# Patient Record
Sex: MALE | Race: WHITE | NOT HISPANIC OR LATINO | Employment: OTHER | URBAN - NONMETROPOLITAN AREA
[De-identification: names, ages, dates, MRNs, and addresses within clinical notes are randomized per-mention and may not be internally consistent; named-entity substitution may affect disease eponyms.]

---

## 2021-06-11 NOTE — PROGRESS NOTES
"Patient: Sabina Wilkinson    YOB: 1955    Date: 06/18/2021    Primary Care Provider: Arianna Donnelly MD    Chief Complaint   Patient presents with   • Hernia     eval inguinal       SUBJECTIVE:    History of present illness:  Patient is s/p left inguinal hernia repair which was done in the remote past.  I saw the patient in the office today as a consultation for evaluation and treatment of a painful bulge in his right inguinal area which has been present for \"several months.\"  Patient has pain with prolonged standing and lifting.  No change in bowel habits no nausea or vomiting.    The following portions of the patient's history were reviewed and updated as appropriate: allergies, current medications, past family history, past medical history, past social history, past surgical history and problem list.    Review of Systems   Constitutional: Negative for chills, fever and unexpected weight change.   HENT: Negative for trouble swallowing and voice change.    Eyes: Negative for visual disturbance.   Respiratory: Negative for apnea, cough, chest tightness, shortness of breath and wheezing.    Cardiovascular: Negative for chest pain, palpitations and leg swelling.   Gastrointestinal: Negative for abdominal distention, abdominal pain, anal bleeding, blood in stool, constipation, diarrhea, nausea, rectal pain and vomiting.   Endocrine: Negative for cold intolerance and heat intolerance.   Genitourinary: Negative for difficulty urinating, dysuria, flank pain, scrotal swelling and testicular pain.   Musculoskeletal: Negative for back pain, gait problem and joint swelling.   Skin: Negative for color change, rash and wound.   Neurological: Negative for dizziness, syncope, speech difficulty, weakness, numbness and headaches.   Hematological: Negative for adenopathy. Does not bruise/bleed easily.   Psychiatric/Behavioral: Negative for confusion. The patient is not nervous/anxious.        History:  Past Medical " "History:   Diagnosis Date   • High cholesterol    • Hypertension        Past Surgical History:   Procedure Laterality Date   • APPENDECTOMY     • HERNIA REPAIR     • NOSE SURGERY         Family History   Problem Relation Age of Onset   • Hypertension Mother    • Stroke Mother        Social History     Tobacco Use   • Smoking status: Never Smoker   • Smokeless tobacco: Never Used   Substance Use Topics   • Alcohol use: Defer   • Drug use: Defer       Allergies:  Allergies   Allergen Reactions   • Penicillins Other (See Comments) and Rash       Medications:    Current Outpatient Medications:   •  lisinopril (PRINIVIL,ZESTRIL) 20 MG tablet, , Disp: , Rfl:   •  lovastatin (MEVACOR) 40 MG tablet, , Disp: , Rfl:   •  omeprazole OTC (PriLOSEC OTC) 20 MG EC tablet, omeprazole 20 mg tablet,delayed release, Disp: , Rfl:     OBJECTIVE:    Vital Signs:   Vitals:    06/18/21 1410   BP: 140/82   Pulse: 67   Temp: 98.4 °F (36.9 °C)   SpO2: 99%   Weight: 92.9 kg (204 lb 12.8 oz)   Height: 182.9 cm (72\")       Physical Exam:   General Appearance:    Alert, cooperative, in no acute distress   Head:    Normocephalic, without obvious abnormality, atraumatic   Eyes:            Lids and lashes normal, conjunctivae and sclerae normal, no   icterus, no pallor, corneas clear, PERRLA   Ears:    Ears appear intact with no abnormalities noted   Throat:   No oral lesions, no thrush, oral mucosa moist   Neck:   No adenopathy, supple, trachea midline, no thyromegaly, no   carotid bruit, no JVD   Lungs:     Clear to auscultation,respirations regular, even and                  unlabored    Heart:    Regular rhythm and normal rate, normal S1 and S2, no            murmur   Abdomen:     no masses, no organomegaly, soft non-tender, non-distended, no guarding, there is evidence of a large right inguinal hernia, reducible and nontender to palpation.   Extremities:   Moves all extremities well, no edema, no cyanosis, no             redness   Pulses:   " Pulses palpable and equal bilaterally   Skin:   No bleeding, bruising or rash   Lymph nodes:   No palpable adenopathy   Neurologic:   Cranial nerves 2 - 12 grossly intact, sensation intact        Results Review:   I reviewed the patient's new clinical results.    Review of Systems was reviewed and confirmed as accurate as documented by the MA.    ASSESSMENT/PLAN:    1. Non-recurrent unilateral inguinal hernia without obstruction or gangrene        I had a detailed and extensive discussion with the patient in the office and they understand that they need to undergo right inguinal hernia repair with mesh.  Full risks and benefits of operative versus nonoperative intervention were discussed with the patient and these included things such as nonresolution of symptoms and possible worsening of symptoms without surgical intervention versus infection, bleeding, possible recurrent hernia, possible postoperative neuralgia from nerve damage or involvement with scar tissue, etc.  The patient understands, agrees, and had no questions for me at the end of the office visit.     I discussed the patients findings and my recommendations with patient.    Electronically signed by Lucille Severino MD  06/18/21

## 2021-06-18 ENCOUNTER — OFFICE VISIT (OUTPATIENT)
Dept: SURGERY | Facility: CLINIC | Age: 66
End: 2021-06-18

## 2021-06-18 VITALS
TEMPERATURE: 98.4 F | OXYGEN SATURATION: 99 % | DIASTOLIC BLOOD PRESSURE: 82 MMHG | SYSTOLIC BLOOD PRESSURE: 140 MMHG | BODY MASS INDEX: 27.74 KG/M2 | HEART RATE: 67 BPM | HEIGHT: 72 IN | WEIGHT: 204.8 LBS

## 2021-06-18 DIAGNOSIS — K40.90 NON-RECURRENT UNILATERAL INGUINAL HERNIA WITHOUT OBSTRUCTION OR GANGRENE: Primary | ICD-10-CM

## 2021-06-18 DIAGNOSIS — Z01.818 PRE-OP TESTING: Primary | ICD-10-CM

## 2021-06-18 PROCEDURE — 99203 OFFICE O/P NEW LOW 30 MIN: CPT | Performed by: SURGERY

## 2021-06-18 RX ORDER — LOVASTATIN 40 MG/1
40 TABLET ORAL NIGHTLY
COMMUNITY
Start: 2021-06-02 | End: 2022-02-04 | Stop reason: ALTCHOICE

## 2021-06-18 RX ORDER — OMEPRAZOLE 20 MG/1
20 TABLET, DELAYED RELEASE ORAL DAILY
COMMUNITY
End: 2022-02-04

## 2021-06-18 RX ORDER — LISINOPRIL 20 MG/1
20 TABLET ORAL 2 TIMES DAILY
COMMUNITY
Start: 2021-06-11 | End: 2022-03-17

## 2021-06-25 ENCOUNTER — PRE-ADMISSION TESTING (OUTPATIENT)
Dept: PREADMISSION TESTING | Facility: HOSPITAL | Age: 66
End: 2021-06-25

## 2021-06-25 VITALS — BODY MASS INDEX: 27.78 KG/M2 | HEIGHT: 72 IN

## 2021-06-25 DIAGNOSIS — Z01.818 PRE-OP TESTING: ICD-10-CM

## 2021-06-25 LAB
ANION GAP SERPL CALCULATED.3IONS-SCNC: 7.9 MMOL/L (ref 5–15)
BUN SERPL-MCNC: 8 MG/DL (ref 8–23)
BUN/CREAT SERPL: 8.3 (ref 7–25)
CALCIUM SPEC-SCNC: 10.3 MG/DL (ref 8.6–10.5)
CHLORIDE SERPL-SCNC: 104 MMOL/L (ref 98–107)
CO2 SERPL-SCNC: 26.1 MMOL/L (ref 22–29)
CREAT SERPL-MCNC: 0.96 MG/DL (ref 0.76–1.27)
DEPRECATED RDW RBC AUTO: 41.6 FL (ref 37–54)
ERYTHROCYTE [DISTWIDTH] IN BLOOD BY AUTOMATED COUNT: 12 % (ref 12.3–15.4)
GFR SERPL CREATININE-BSD FRML MDRD: 79 ML/MIN/1.73
GLUCOSE SERPL-MCNC: 125 MG/DL (ref 65–99)
HCT VFR BLD AUTO: 41.6 % (ref 37.5–51)
HGB BLD-MCNC: 13.3 G/DL (ref 13–17.7)
MCH RBC QN AUTO: 30 PG (ref 26.6–33)
MCHC RBC AUTO-ENTMCNC: 32 G/DL (ref 31.5–35.7)
MCV RBC AUTO: 93.7 FL (ref 79–97)
PLATELET # BLD AUTO: 278 10*3/MM3 (ref 140–450)
PMV BLD AUTO: 10.4 FL (ref 6–12)
POTASSIUM SERPL-SCNC: 4.1 MMOL/L (ref 3.5–5.2)
QT INTERVAL: 388 MS
QTC INTERVAL: 427 MS
RBC # BLD AUTO: 4.44 10*6/MM3 (ref 4.14–5.8)
SARS-COV-2 RNA NOSE QL NAA+PROBE: NOT DETECTED
SODIUM SERPL-SCNC: 138 MMOL/L (ref 136–145)
WBC # BLD AUTO: 4.93 10*3/MM3 (ref 3.4–10.8)

## 2021-06-25 PROCEDURE — 93005 ELECTROCARDIOGRAM TRACING: CPT

## 2021-06-25 PROCEDURE — U0004 COV-19 TEST NON-CDC HGH THRU: HCPCS

## 2021-06-25 PROCEDURE — 85027 COMPLETE CBC AUTOMATED: CPT

## 2021-06-25 PROCEDURE — 93010 ELECTROCARDIOGRAM REPORT: CPT | Performed by: INTERNAL MEDICINE

## 2021-06-25 PROCEDURE — 36415 COLL VENOUS BLD VENIPUNCTURE: CPT

## 2021-06-25 PROCEDURE — 80048 BASIC METABOLIC PNL TOTAL CA: CPT

## 2021-06-25 PROCEDURE — C9803 HOPD COVID-19 SPEC COLLECT: HCPCS

## 2021-06-25 RX ORDER — SODIUM PHOSPHATE,MONO-DIBASIC 19G-7G/118
2 ENEMA (ML) RECTAL DAILY
COMMUNITY

## 2021-06-25 RX ORDER — ASCORBIC ACID 500 MG
500 TABLET ORAL DAILY
COMMUNITY

## 2021-06-25 NOTE — DISCHARGE INSTRUCTIONS
PAT PASS GIVEN/REVIEWED WITH PT.  VERBALIZED UNDERSTANDING OF THE FOLLOWING:  DO NOT EAT, DRINK, SMOKE, USE SMOKELESS TOBACCO OR CHEW GUM AFTER MIDNIGHT THE NIGHT BEFORE SURGERY.  THIS ALSO INCLUDES HARD CANDIES AND MINTS.    DO NOT SHAVE THE AREA TO BE OPERATED ON AT LEAST 48 HOURS PRIOR TO THE PROCEDURE.  DO NOT WEAR MAKE UP OR NAIL POLISH.  DO NOT LEAVE IN ANY PIERCING OR WEAR JEWELRY THE DAY OF SURGERY.      DO NOT USE ADHESIVES IF YOU WEAR DENTURES.    DO NOT WEAR EYE CONTACTS; BRING IN YOUR GLASSES.    ONLY TAKE MEDICATION THE MORNING OF YOUR PROCEDURE IF INSTRUCTED BY YOUR SURGEON WITH ENOUGH WATER TO SWALLOW THE MEDICATION.  IF YOUR SURGEON DID NOT SPECIFY WHICH MEDICATIONS TO TAKE, YOU WILL NEED TO CALL THEIR OFFICE FOR FURTHER INSTRUCTIONS AND DO AS THEY INSTRUCT.    LEAVE ANYTHING YOU CONSIDER VALUABLE AT HOME.    YOU WILL NEED TO ARRANGE FOR SOMEONE TO DRIVE YOU HOME AFTER SURGERY.  IT IS RECOMMENDED THAT YOU DO NOT DRIVE, WORK, DRINK ALCOHOL OR MAKE MAJOR DECISIONS FOR AT LEAST 24 HOURS AFTER YOUR PROCEDURE IS COMPLETE.      THE DAY OF YOUR PROCEDURE, BRING IN THE FOLLOWING IF APPLICABLE:   PICTURE ID AND INSURANCE/MEDICARE OR MEDICAID CARDS/ANY CO-PAY THAT MAY BE DUE   COPY OF ADVANCED DIRECTIVE/LIVING WILL/POWER OR    CPAP/BIPAP/INHALERS   SKIN PREP SHEET   YOUR PREADMISSION TESTING PASS (IF NOT A PHONE HISTORY)        Chlorhexidine wipes along with instruction/verification sheet given to pt.  Instructed pt to date, time, and initial the verification sheet once skin prep has been  completed, and [facility].  Pt. Verbalizes understanding.      COVID self-quarantine instructions reviewed with the pt.  Verbalized understanding.

## 2021-06-25 NOTE — PAT
"Called anesthesia phone and spoke with Link Simmons CRNA.  Informed that pt has an upcoming procedure on 6/28/21 with Dr. Severino (inguinal hernia repair).  Pt had an episode of syncope on 5/6/21.  Pt reports that he hit his treadmill during the fall and fractured his face requiring surgery at .  Pt stated that he had a cardiology consult.  Pt was ordered an ECHO, heart monitor, and stress test.  Pt stated that he saw a cardiologist as an outpt following testing, and was released from his care due to his testing being WNL. Reviewed written preliminary EKG report done in [facility] today, ECHO results from 5/7/21, and stress test from 5/10/21, with FRANCY.  Link stated, \"sounds good\".  Nothing further required prior to procedure with Dr. Severino.    "

## 2021-06-28 ENCOUNTER — ANESTHESIA (OUTPATIENT)
Dept: PERIOP | Facility: HOSPITAL | Age: 66
End: 2021-06-28

## 2021-06-28 ENCOUNTER — HOSPITAL ENCOUNTER (OUTPATIENT)
Facility: HOSPITAL | Age: 66
Setting detail: HOSPITAL OUTPATIENT SURGERY
Discharge: HOME | End: 2021-06-28
Attending: SURGERY | Admitting: SURGERY

## 2021-06-28 ENCOUNTER — ANESTHESIA EVENT (OUTPATIENT)
Dept: PERIOP | Facility: HOSPITAL | Age: 66
End: 2021-06-28

## 2021-06-28 VITALS
TEMPERATURE: 97.5 F | SYSTOLIC BLOOD PRESSURE: 160 MMHG | OXYGEN SATURATION: 97 % | HEART RATE: 60 BPM | DIASTOLIC BLOOD PRESSURE: 79 MMHG | RESPIRATION RATE: 16 BRPM

## 2021-06-28 DIAGNOSIS — K40.90 NON-RECURRENT UNILATERAL INGUINAL HERNIA WITHOUT OBSTRUCTION OR GANGRENE: ICD-10-CM

## 2021-06-28 PROCEDURE — C1781 MESH (IMPLANTABLE): HCPCS | Performed by: SURGERY

## 2021-06-28 PROCEDURE — 25010000002 ONDANSETRON PER 1 MG: Performed by: NURSE ANESTHETIST, CERTIFIED REGISTERED

## 2021-06-28 PROCEDURE — 25010000002 KETOROLAC TROMETHAMINE PER 15 MG: Performed by: NURSE ANESTHETIST, CERTIFIED REGISTERED

## 2021-06-28 PROCEDURE — 25010000002 PROPOFOL 200 MG/20ML EMULSION: Performed by: NURSE ANESTHETIST, CERTIFIED REGISTERED

## 2021-06-28 PROCEDURE — 25010000002 DEXAMETHASONE PER 1 MG: Performed by: NURSE ANESTHETIST, CERTIFIED REGISTERED

## 2021-06-28 PROCEDURE — 49505 PRP I/HERN INIT REDUC >5 YR: CPT | Performed by: SURGERY

## 2021-06-28 DEVICE — VENTRIO ST HERNIA PATCH
Type: IMPLANTABLE DEVICE | Site: GROIN | Status: FUNCTIONAL
Brand: VENTRIO ST HERNIA PATCH

## 2021-06-28 RX ORDER — MEPERIDINE HYDROCHLORIDE 25 MG/ML
12.5 INJECTION INTRAMUSCULAR; INTRAVENOUS; SUBCUTANEOUS
Status: DISCONTINUED | OUTPATIENT
Start: 2021-06-28 | End: 2021-06-28 | Stop reason: HOSPADM

## 2021-06-28 RX ORDER — KETOROLAC TROMETHAMINE 30 MG/ML
INJECTION, SOLUTION INTRAMUSCULAR; INTRAVENOUS AS NEEDED
Status: DISCONTINUED | OUTPATIENT
Start: 2021-06-28 | End: 2021-06-28 | Stop reason: SURG

## 2021-06-28 RX ORDER — BUPIVACAINE HYDROCHLORIDE 5 MG/ML
INJECTION, SOLUTION EPIDURAL; INTRACAUDAL AS NEEDED
Status: DISCONTINUED | OUTPATIENT
Start: 2021-06-28 | End: 2021-06-28 | Stop reason: HOSPADM

## 2021-06-28 RX ORDER — LIDOCAINE HYDROCHLORIDE 20 MG/ML
INJECTION, SOLUTION INTRAVENOUS AS NEEDED
Status: DISCONTINUED | OUTPATIENT
Start: 2021-06-28 | End: 2021-06-28 | Stop reason: SURG

## 2021-06-28 RX ORDER — CLINDAMYCIN PHOSPHATE 900 MG/50ML
900 INJECTION, SOLUTION INTRAVENOUS ONCE
Status: COMPLETED | OUTPATIENT
Start: 2021-06-28 | End: 2021-06-28

## 2021-06-28 RX ORDER — ONDANSETRON 2 MG/ML
4 INJECTION INTRAMUSCULAR; INTRAVENOUS ONCE AS NEEDED
Status: DISCONTINUED | OUTPATIENT
Start: 2021-06-28 | End: 2021-06-28 | Stop reason: HOSPADM

## 2021-06-28 RX ORDER — ONDANSETRON 2 MG/ML
INJECTION INTRAMUSCULAR; INTRAVENOUS AS NEEDED
Status: DISCONTINUED | OUTPATIENT
Start: 2021-06-28 | End: 2021-06-28 | Stop reason: SURG

## 2021-06-28 RX ORDER — DEXAMETHASONE SODIUM PHOSPHATE 4 MG/ML
INJECTION, SOLUTION INTRA-ARTICULAR; INTRALESIONAL; INTRAMUSCULAR; INTRAVENOUS; SOFT TISSUE AS NEEDED
Status: DISCONTINUED | OUTPATIENT
Start: 2021-06-28 | End: 2021-06-28 | Stop reason: SURG

## 2021-06-28 RX ORDER — SODIUM CHLORIDE, SODIUM LACTATE, POTASSIUM CHLORIDE, CALCIUM CHLORIDE 600; 310; 30; 20 MG/100ML; MG/100ML; MG/100ML; MG/100ML
1000 INJECTION, SOLUTION INTRAVENOUS CONTINUOUS
Status: DISCONTINUED | OUTPATIENT
Start: 2021-06-28 | End: 2021-06-28 | Stop reason: HOSPADM

## 2021-06-28 RX ORDER — MAGNESIUM HYDROXIDE 1200 MG/15ML
LIQUID ORAL AS NEEDED
Status: DISCONTINUED | OUTPATIENT
Start: 2021-06-28 | End: 2021-06-28 | Stop reason: HOSPADM

## 2021-06-28 RX ORDER — PROPOFOL 10 MG/ML
INJECTION, EMULSION INTRAVENOUS AS NEEDED
Status: DISCONTINUED | OUTPATIENT
Start: 2021-06-28 | End: 2021-06-28 | Stop reason: SURG

## 2021-06-28 RX ORDER — KETAMINE HCL IN NACL, ISO-OSM 100MG/10ML
SYRINGE (ML) INJECTION AS NEEDED
Status: DISCONTINUED | OUTPATIENT
Start: 2021-06-28 | End: 2021-06-28 | Stop reason: SURG

## 2021-06-28 RX ORDER — HYDROCODONE BITARTRATE AND ACETAMINOPHEN 5; 325 MG/1; MG/1
1-2 TABLET ORAL EVERY 4 HOURS PRN
Qty: 15 TABLET | Refills: 0 | Status: SHIPPED | OUTPATIENT
Start: 2021-06-28 | End: 2021-09-29

## 2021-06-28 RX ADMIN — CLINDAMYCIN PHOSPHATE 900 MG: 900 INJECTION, SOLUTION INTRAVENOUS at 08:16

## 2021-06-28 RX ADMIN — LIDOCAINE HYDROCHLORIDE 100 MG: 20 INJECTION, SOLUTION INTRAVENOUS at 08:09

## 2021-06-28 RX ADMIN — PROPOFOL 200 MG: 10 INJECTION, EMULSION INTRAVENOUS at 08:09

## 2021-06-28 RX ADMIN — DEXAMETHASONE SODIUM PHOSPHATE 8 MG: 4 INJECTION, SOLUTION INTRAMUSCULAR; INTRAVENOUS at 08:11

## 2021-06-28 RX ADMIN — Medication 25 MG: at 08:18

## 2021-06-28 RX ADMIN — Medication 25 MG: at 08:09

## 2021-06-28 RX ADMIN — SODIUM CHLORIDE, POTASSIUM CHLORIDE, SODIUM LACTATE AND CALCIUM CHLORIDE 1000 ML: 600; 310; 30; 20 INJECTION, SOLUTION INTRAVENOUS at 07:31

## 2021-06-28 RX ADMIN — ONDANSETRON 4 MG: 2 INJECTION INTRAMUSCULAR; INTRAVENOUS at 08:11

## 2021-06-28 RX ADMIN — KETOROLAC TROMETHAMINE 15 MG: 30 INJECTION, SOLUTION INTRAMUSCULAR at 08:11

## 2021-06-28 NOTE — ANESTHESIA PREPROCEDURE EVALUATION
Anesthesia Evaluation     Patient summary reviewed and Nursing notes reviewed   NPO Solid Status: > 8 hours  NPO Liquid Status: > 8 hours           Airway   Mallampati: I  TM distance: >3 FB  Neck ROM: full  No difficulty expected  Dental - normal exam     Pulmonary - negative pulmonary ROS and normal exam   Cardiovascular - normal exam    (+) hypertension, hyperlipidemia,       Neuro/Psych  (+) syncope,     GI/Hepatic/Renal/Endo    (+) obesity,       Musculoskeletal (-) negative ROS    Abdominal  - normal exam    Bowel sounds: normal.   Substance History - negative use     OB/GYN negative ob/gyn ROS         Other                        Anesthesia Plan    ASA 3     general     intravenous induction     Anesthetic plan, all risks, benefits, and alternatives have been provided, discussed and informed consent has been obtained with: patient.    Plan discussed with attending.

## 2021-06-28 NOTE — ANESTHESIA POSTPROCEDURE EVALUATION
Patient: Sabina Wilkinson    Procedure Summary     Date: 06/28/21 Room / Location: [facility] OR  / [facility] OR    Anesthesia Start: 0806 Anesthesia Stop: 0849    Procedure: INGUINAL HERNIA REPAIR (Right Abdomen) Diagnosis:       Non-recurrent unilateral inguinal hernia without obstruction or gangrene      (Non-recurrent unilateral inguinal hernia without obstruction or gangrene [K40.90])    Surgeons: Lucille Severino MD Provider: Richard Frank CRNA    Anesthesia Type: general ASA Status: 3          Anesthesia Type: general    Vitals  Vitals Value Taken Time   /78 06/28/21 0950   Temp 99.1 °F (37.3 °C) 06/28/21 0950   Pulse 62 06/28/21 0950   Resp 15 06/28/21 0950   SpO2 100 % 06/28/21 0950           Post Anesthesia Care and Evaluation    Patient location during evaluation: PACU  Patient participation: complete - patient participated  Level of consciousness: awake and alert  Pain management: satisfactory to patient  Airway patency: patent  Anesthetic complications: No anesthetic complications    Cardiovascular status: acceptable and hemodynamically stable  Respiratory status: acceptable  Hydration status: acceptable

## 2021-06-28 NOTE — ANESTHESIA PROCEDURE NOTES
Airway  Urgency: elective    Date/Time: 6/28/2021 8:10 AM  Airway not difficult    General Information and Staff    Patient location during procedure: OR  CRNA: Richard Frank CRNA    Indications and Patient Condition  Indications for airway management: airway protection    Preoxygenated: yes  Mask difficulty assessment: 0 - not attempted    Final Airway Details  Final airway type: supraglottic airway      Successful airway: classic  Size 4    Number of attempts at approach: 1  Assessment: lips, teeth, and gum same as pre-op

## 2021-07-02 NOTE — PROGRESS NOTES
"Patient: Sabina Wilkinson    YOB: 1955    Date: 07/07/2021    Primary Care Provider: Arianna Donnelly MD    Chief Complaint   Patient presents with   • Post-op Hernia       History of present illness:  I saw the patient in the office today as a followup from their recent hernia repair.  They state that they have done well and are having no complaints.    Vital Signs:   Vitals:    07/07/21 0946   BP: 134/78   Pulse: 64   Temp: 97.9 °F (36.6 °C)   SpO2: 98%   Weight: 93.4 kg (206 lb)   Height: 182.9 cm (72\")       Physical Exam:   General Appearance:    Alert, cooperative, in no acute distress   Abdomen:     no masses, no organomegaly, soft non-tender, non-distended, no guarding, wounds are well healed, no evidence of recurrent hernia     Assessment / Plan:    1. Postoperative visit        I did discuss the situation with the patient today in the office and they have done well from their recent herniorraphy.  I have released the patient back to normal activity, they understand that they need to be careful about heavy lifting.  I need to see the patient back in the office only if they are having further problems, they know to call me if they are.    Electronically signed by Lucille Severino MD  07/07/21                "

## 2021-07-07 ENCOUNTER — OFFICE VISIT (OUTPATIENT)
Dept: SURGERY | Facility: CLINIC | Age: 66
End: 2021-07-07

## 2021-07-07 VITALS
HEART RATE: 64 BPM | BODY MASS INDEX: 27.9 KG/M2 | WEIGHT: 206 LBS | OXYGEN SATURATION: 98 % | SYSTOLIC BLOOD PRESSURE: 134 MMHG | HEIGHT: 72 IN | TEMPERATURE: 97.9 F | DIASTOLIC BLOOD PRESSURE: 78 MMHG

## 2021-07-07 DIAGNOSIS — Z48.89 POSTOPERATIVE VISIT: Primary | ICD-10-CM

## 2021-07-07 PROCEDURE — 99024 POSTOP FOLLOW-UP VISIT: CPT | Performed by: SURGERY

## 2021-09-29 ENCOUNTER — OFFICE VISIT (OUTPATIENT)
Dept: GASTROENTEROLOGY | Facility: CLINIC | Age: 66
End: 2021-09-29

## 2021-09-29 ENCOUNTER — LAB (OUTPATIENT)
Dept: LAB | Facility: HOSPITAL | Age: 66
End: 2021-09-29

## 2021-09-29 VITALS
WEIGHT: 209 LBS | TEMPERATURE: 98.4 F | HEART RATE: 62 BPM | BODY MASS INDEX: 28.31 KG/M2 | DIASTOLIC BLOOD PRESSURE: 80 MMHG | SYSTOLIC BLOOD PRESSURE: 146 MMHG | RESPIRATION RATE: 16 BRPM | HEIGHT: 72 IN

## 2021-09-29 DIAGNOSIS — R55 SYNCOPE, UNSPECIFIED SYNCOPE TYPE: ICD-10-CM

## 2021-09-29 DIAGNOSIS — R19.7 DIARRHEA, UNSPECIFIED TYPE: ICD-10-CM

## 2021-09-29 DIAGNOSIS — R19.4 CHANGE IN BOWEL HABITS: Primary | ICD-10-CM

## 2021-09-29 DIAGNOSIS — R19.4 CHANGE IN BOWEL HABITS: ICD-10-CM

## 2021-09-29 DIAGNOSIS — Z86.010 HISTORY OF COLON POLYPS: ICD-10-CM

## 2021-09-29 DIAGNOSIS — R19.8 BORBORYGMI: ICD-10-CM

## 2021-09-29 DIAGNOSIS — R14.3 FLATULENCE: ICD-10-CM

## 2021-09-29 LAB
ALBUMIN SERPL-MCNC: 4.3 G/DL (ref 3.5–5.2)
ALBUMIN/GLOB SERPL: 1.8 G/DL
ALP SERPL-CCNC: 56 U/L (ref 39–117)
ALT SERPL W P-5'-P-CCNC: 19 U/L (ref 1–41)
ANION GAP SERPL CALCULATED.3IONS-SCNC: 7.6 MMOL/L (ref 5–15)
AST SERPL-CCNC: 16 U/L (ref 1–40)
BASOPHILS # BLD AUTO: 0.06 10*3/MM3 (ref 0–0.2)
BASOPHILS NFR BLD AUTO: 1.3 % (ref 0–1.5)
BILIRUB SERPL-MCNC: 1 MG/DL (ref 0–1.2)
BUN SERPL-MCNC: 11 MG/DL (ref 8–23)
BUN/CREAT SERPL: 12.1 (ref 7–25)
CALCIUM SPEC-SCNC: 9.7 MG/DL (ref 8.6–10.5)
CHLORIDE SERPL-SCNC: 106 MMOL/L (ref 98–107)
CO2 SERPL-SCNC: 28.4 MMOL/L (ref 22–29)
CREAT SERPL-MCNC: 0.91 MG/DL (ref 0.76–1.27)
DEPRECATED RDW RBC AUTO: 41 FL (ref 37–54)
EOSINOPHIL # BLD AUTO: 0.09 10*3/MM3 (ref 0–0.4)
EOSINOPHIL NFR BLD AUTO: 1.9 % (ref 0.3–6.2)
ERYTHROCYTE [DISTWIDTH] IN BLOOD BY AUTOMATED COUNT: 12.2 % (ref 12.3–15.4)
GFR SERPL CREATININE-BSD FRML MDRD: 83 ML/MIN/1.73
GLOBULIN UR ELPH-MCNC: 2.4 GM/DL
GLUCOSE SERPL-MCNC: 91 MG/DL (ref 65–99)
HCT VFR BLD AUTO: 38.5 % (ref 37.5–51)
HGB BLD-MCNC: 12.9 G/DL (ref 13–17.7)
IGA1 MFR SER: 143 MG/DL (ref 70–400)
IMM GRANULOCYTES # BLD AUTO: 0.01 10*3/MM3 (ref 0–0.05)
IMM GRANULOCYTES NFR BLD AUTO: 0.2 % (ref 0–0.5)
LYMPHOCYTES # BLD AUTO: 1.79 10*3/MM3 (ref 0.7–3.1)
LYMPHOCYTES NFR BLD AUTO: 37.4 % (ref 19.6–45.3)
MCH RBC QN AUTO: 30.9 PG (ref 26.6–33)
MCHC RBC AUTO-ENTMCNC: 33.5 G/DL (ref 31.5–35.7)
MCV RBC AUTO: 92.3 FL (ref 79–97)
MONOCYTES # BLD AUTO: 0.57 10*3/MM3 (ref 0.1–0.9)
MONOCYTES NFR BLD AUTO: 11.9 % (ref 5–12)
NEUTROPHILS NFR BLD AUTO: 2.26 10*3/MM3 (ref 1.7–7)
NEUTROPHILS NFR BLD AUTO: 47.3 % (ref 42.7–76)
NRBC BLD AUTO-RTO: 0 /100 WBC (ref 0–0.2)
PLATELET # BLD AUTO: 251 10*3/MM3 (ref 140–450)
PMV BLD AUTO: 11.9 FL (ref 6–12)
POTASSIUM SERPL-SCNC: 3.9 MMOL/L (ref 3.5–5.2)
PROT SERPL-MCNC: 6.7 G/DL (ref 6–8.5)
RBC # BLD AUTO: 4.17 10*6/MM3 (ref 4.14–5.8)
SODIUM SERPL-SCNC: 142 MMOL/L (ref 136–145)
TSH SERPL DL<=0.05 MIU/L-ACNC: 0.91 UIU/ML (ref 0.27–4.2)
WBC # BLD AUTO: 4.78 10*3/MM3 (ref 3.4–10.8)

## 2021-09-29 PROCEDURE — 99203 OFFICE O/P NEW LOW 30 MIN: CPT | Performed by: PHYSICIAN ASSISTANT

## 2021-09-29 PROCEDURE — 80053 COMPREHEN METABOLIC PANEL: CPT

## 2021-09-29 PROCEDURE — 83516 IMMUNOASSAY NONANTIBODY: CPT

## 2021-09-29 PROCEDURE — 85025 COMPLETE CBC W/AUTO DIFF WBC: CPT

## 2021-09-29 PROCEDURE — 82784 ASSAY IGA/IGD/IGG/IGM EACH: CPT

## 2021-09-29 PROCEDURE — 84443 ASSAY THYROID STIM HORMONE: CPT

## 2021-09-29 PROCEDURE — 36415 COLL VENOUS BLD VENIPUNCTURE: CPT

## 2021-09-29 RX ORDER — HYDROCHLOROTHIAZIDE 25 MG/1
25 TABLET ORAL DAILY
COMMUNITY

## 2021-09-30 LAB — TTG IGA SER-ACNC: <2 U/ML (ref 0–3)

## 2021-09-30 PROCEDURE — 83993 ASSAY FOR CALPROTECTIN FECAL: CPT | Performed by: PHYSICIAN ASSISTANT

## 2021-09-30 PROCEDURE — 82656 EL-1 FECAL QUAL/SEMIQ: CPT | Performed by: PHYSICIAN ASSISTANT

## 2021-09-30 PROCEDURE — 0097U HC BIOFIRE FILMARRAY GI PANEL: CPT | Performed by: PHYSICIAN ASSISTANT

## 2021-10-08 ENCOUNTER — TELEPHONE (OUTPATIENT)
Dept: GASTROENTEROLOGY | Facility: CLINIC | Age: 66
End: 2021-10-08

## 2021-10-08 DIAGNOSIS — R55 SYNCOPE, UNSPECIFIED SYNCOPE TYPE: Primary | ICD-10-CM

## 2021-10-08 DIAGNOSIS — R19.7 DIARRHEA, UNSPECIFIED TYPE: ICD-10-CM

## 2021-10-08 DIAGNOSIS — R10.9 ABDOMINAL CRAMPING: ICD-10-CM

## 2021-10-08 NOTE — TELEPHONE ENCOUNTER
Please let pt know I discussed his case with Dr. Timmons and he will need a 24 hour urine test and another blood test due to his complaints.

## 2021-10-12 ENCOUNTER — APPOINTMENT (OUTPATIENT)
Dept: CT IMAGING | Facility: HOSPITAL | Age: 66
End: 2021-10-12

## 2021-10-13 ENCOUNTER — LAB (OUTPATIENT)
Dept: LAB | Facility: HOSPITAL | Age: 66
End: 2021-10-13

## 2021-10-13 DIAGNOSIS — R10.9 ABDOMINAL CRAMPING: ICD-10-CM

## 2021-10-13 DIAGNOSIS — R19.7 DIARRHEA, UNSPECIFIED TYPE: ICD-10-CM

## 2021-10-13 DIAGNOSIS — R55 SYNCOPE, UNSPECIFIED SYNCOPE TYPE: ICD-10-CM

## 2021-10-13 PROCEDURE — 83519 RIA NONANTIBODY: CPT | Performed by: PHYSICIAN ASSISTANT

## 2021-10-14 ENCOUNTER — HOSPITAL ENCOUNTER (OUTPATIENT)
Dept: CT IMAGING | Facility: HOSPITAL | Age: 66
Discharge: HOME | End: 2021-10-14
Admitting: PHYSICIAN ASSISTANT

## 2021-10-14 DIAGNOSIS — R55 SYNCOPE, UNSPECIFIED SYNCOPE TYPE: ICD-10-CM

## 2021-10-14 DIAGNOSIS — R19.8 BORBORYGMI: ICD-10-CM

## 2021-10-14 DIAGNOSIS — R19.4 CHANGE IN BOWEL HABITS: ICD-10-CM

## 2021-10-14 DIAGNOSIS — R19.7 DIARRHEA, UNSPECIFIED TYPE: ICD-10-CM

## 2021-10-14 DIAGNOSIS — R14.3 FLATULENCE: ICD-10-CM

## 2021-10-14 PROCEDURE — 74177 CT ABD & PELVIS W/CONTRAST: CPT

## 2021-10-14 PROCEDURE — 25010000002 IOPAMIDOL 61 % SOLUTION: Performed by: PHYSICIAN ASSISTANT

## 2021-10-14 RX ADMIN — IOPAMIDOL 100 ML: 612 INJECTION, SOLUTION INTRAVENOUS at 07:56

## 2021-10-18 PROCEDURE — 81050 URINALYSIS VOLUME MEASURE: CPT

## 2021-10-18 PROCEDURE — 83497 ASSAY OF 5-HIAA: CPT

## 2021-10-26 LAB
5OH-INDOLEACETATE 24H UR-MCNC: 2.3 MG/L
5OH-INDOLEACETATE 24H UR-MRATE: 5.5 MG/24 HR (ref 0–14.9)

## 2021-11-09 ENCOUNTER — OFFICE VISIT (OUTPATIENT)
Dept: GASTROENTEROLOGY | Facility: CLINIC | Age: 66
End: 2021-11-09

## 2021-11-09 VITALS
BODY MASS INDEX: 28.99 KG/M2 | DIASTOLIC BLOOD PRESSURE: 81 MMHG | HEART RATE: 70 BPM | RESPIRATION RATE: 12 BRPM | TEMPERATURE: 97.8 F | SYSTOLIC BLOOD PRESSURE: 144 MMHG | HEIGHT: 72 IN | WEIGHT: 214 LBS

## 2021-11-09 DIAGNOSIS — R19.4 CHANGE IN BOWEL HABITS: Primary | ICD-10-CM

## 2021-11-09 DIAGNOSIS — R55 SYNCOPE, UNSPECIFIED SYNCOPE TYPE: ICD-10-CM

## 2021-11-09 DIAGNOSIS — R19.7 DIARRHEA, UNSPECIFIED TYPE: ICD-10-CM

## 2021-11-09 PROCEDURE — 99213 OFFICE O/P EST LOW 20 MIN: CPT | Performed by: PHYSICIAN ASSISTANT

## 2021-11-09 NOTE — PATIENT INSTRUCTIONS
Urinary HIAA ruled out carcinoid   Serum trypsin level ruled out systemic mastocytosis   Celiac disease negative  Hgb minimally decreased 12.9 (normal is 13)  Liver enzymes normal  Kidney function normal  Thyroid normal  Stool testing all negative for bacteria, inflammation or pancreatic insufficiency  CT scan did not show any contributing GI pathology

## 2021-11-09 NOTE — PROGRESS NOTES
"     Follow Up Note     Date: 2021   Patient Name: Sabina Wilkinson  MRN: [id number]  : 1955     Primary Care Provider: Arianna Donnelly MD     Chief Complaint   Patient presents with   • Follow-up   • Diarrhea   • Gas     Flatulence   • Syncope   • Change in bowel habits     History of present illness:   11/10/2021  Sabina Wilkinson is a 66 y.o. male who is here today for follow up regarding Diarrhea, Gas (Flatulence), Syncope, and Change in bowel habits.    Had COVID booster since last visit, has continued to have episodes of syncope. Most recent episode was lying in the bed, not sitting up with his head down as his previous episodes. His first episode of syncope occurred in May 2021, 2 weeks after 2nd dose COVID vaccine. Already seen cardiology, would like neurology referral. He had labs, urine testing and CTAP since last visit and would like to discuss those results.     Interval History:  2021  He had a syncope while riding a stationary bike for the first time 2021. He has had episodes recently which have occurred 2 days in a row, last episode was on . He is not always moving when the syncope occurs. It can occur when he is sitting still. No increased stress recently other than injuries related to syncope. These can occur morning or night, no consistent times that it occurs. He has not associated any activities to his syncope or any foods or drinks related. He had cardiac testing including stress test which was all normal.  He has severe \"abdominal rolling\" with borborygmi prior to syncope and excessive flatulence during syncope, after awakening he has diarrhea. He is planning to see neurology next if no GI problems. Prior to these episodes, he typically always had constipation with 2 skip days between bowel movements and hard stools. He has loose stool every 2-3 days. He will skip 1 day intermittently. Stool is loose not watery. No rectal bleeding. No black stools. He does take Aleve 1 " tab every 4-5 days due to joint pains, since 2016. Has been taking omeprazole 20 mg once daily for approx 15 years with good relief of acid reflux. No dysphagia. He has lost some weight since his first episode. No nausea. Appetite is good. No fecal urgency or incontinence. No current abdominal pain.      Last colonoscopy was in 2017 at [place] and had 1 colon polyp removed. There is no known family history of colon cancer or colon polyps. No family history of GI disease.     Subjective     Past Medical History:   Diagnosis Date   • COVID-19 vaccine series completed     Moderna   • ED (erectile dysfunction)    • GERD (gastroesophageal reflux disease)    • H/O echocardiogram 05/06/2021    at  [facility] Pt states was WNL   • High cholesterol    • High triglycerides    • History of cardiac monitoring 05/10/2021    x 2 weeks.     • Hx of exercise stress test 05/10/2021    pt states was WNL   • Hypertension    • Iron deficiency    • Sinus problem    • Snores    • Syncope     5/6/21:  was seen at  [facility] Had a stress, ECHO, heart monitor (pt states was WNL) and saw  cardiology for f/u and was released.  states PCP, Dr. Donnelly, decreased lisinopril from 30 mg BID to 20 mg BID and pt asymptomatic since.    • Vertigo    • Vision problems    • Vitamin B12 deficiency    • Wears dentures     top plate   • Wears glasses    • Wears glasses      Past Surgical History:   Procedure Laterality Date   • APPENDECTOMY     • COLONOSCOPY  2017   • EYE SURGERY     • FRACTURE SURGERY Right 05/06/2021    cheekbone fx due to fall (metal implant right orbit)   • HERNIA REPAIR     • INGUINAL HERNIA REPAIR Right 6/28/2021    Procedure: INGUINAL HERNIA REPAIR;  Surgeon: Lucille Severino MD;  Location: [facility];  Service: General;  Laterality: Right;   • NOSE SURGERY  05/06/2021    fracture secondary to fall      Family History   Problem Relation Age of Onset   • Hypertension Mother    • Stroke Mother    • Colon cancer Neg Hx      Social  History     Socioeconomic History   • Marital status:    Tobacco Use   • Smoking status: Never Smoker   • Smokeless tobacco: Never Used   Vaping Use   • Vaping Use: Never used   Substance and Sexual Activity   • Alcohol use: Never   • Drug use: Never   • Sexual activity: Defer     Current Outpatient Medications:   •  ascorbic acid (VITAMIN C) 500 MG tablet, Take 500 mg by mouth Daily., Disp: , Rfl:   •  Cyanocobalamin (VITAMIN B 12 PO), Take 250 mcg by mouth Daily., Disp: , Rfl:   •  glucosamine-chondroitin 500-400 MG capsule capsule, Take 2 capsules by mouth Daily., Disp: , Rfl:   •  hydroCHLOROthiazide (HYDRODIURIL) 25 MG tablet, Take 25 mg by mouth Daily., Disp: , Rfl:   •  lisinopril (PRINIVIL,ZESTRIL) 20 MG tablet, Take 20 mg by mouth 2 (two) times a day., Disp: , Rfl:   •  lovastatin (MEVACOR) 40 MG tablet, Take 40 mg by mouth Every Night., Disp: , Rfl:   •  omeprazole OTC (PriLOSEC OTC) 20 MG EC tablet, Take 20 mg by mouth Daily., Disp: , Rfl:   •  VITAMIN D PO, Take 500 mg by mouth Daily., Disp: , Rfl:     Allergies   Allergen Reactions   • Penicillins Other (See Comments) and Rash     The following portions of the patient's history were reviewed and updated as appropriate: allergies, current medications, past family history, past medical history, past social history, past surgical history and problem list.    Objective     Physical Exam  Constitutional:       General: He is not in acute distress.     Appearance: Normal appearance. He is well-developed. He is not diaphoretic.   HENT:      Head: Normocephalic and atraumatic.      Right Ear: External ear normal.      Left Ear: External ear normal.      Nose: Nose normal.      Mouth/Throat:      Comments: Wearing a mask  Eyes:      General: No scleral icterus.        Right eye: No discharge.         Left eye: No discharge.      Conjunctiva/sclera: Conjunctivae normal.   Neck:      Trachea: No tracheal deviation.   Pulmonary:      Effort: Pulmonary effort  "is normal. No respiratory distress.   Musculoskeletal:         General: Normal range of motion.      Cervical back: Normal range of motion.   Skin:     Coloration: Skin is not pale.      Findings: No erythema or rash.   Neurological:      Mental Status: He is alert and oriented to person, place, and time.      Coordination: Coordination normal.   Psychiatric:         Mood and Affect: Mood normal.         Behavior: Behavior normal.         Thought Content: Thought content normal.         Judgment: Judgment normal.       Vitals:    11/09/21 1039   BP: 144/81   Pulse: 70   Resp: 12   Temp: 97.8 °F (36.6 °C)   TempSrc: Infrared   Weight: 97.1 kg (214 lb)   Height: 182.9 cm (72\")     Results Review:   I reviewed the patient's new clinical results.    Results Encounter on 10/13/2021   Component Date Value Ref Range Status   • Trypsin 10/13/2021 255  169 - 773 ng/mL Final   Lab on 10/13/2021   Component Date Value Ref Range Status   • 5-HIAA, Urine 10/18/2021 2.3  Undefined mg/L Final    This test was developed and its performance characteristics  determined by [facility]. It has not been cleared or approved  by the Food and Drug Administration.   • 5-HIAA, 24H Ur 10/18/2021 5.5  0.0 - 14.9 mg/24 hr Final   Results Encounter on 09/29/2021   Component Date Value Ref Range Status   • Calprotectin, Fecal 09/30/2021 52  0 - 120 ug/g Final    Concentration     Interpretation   Follow-Up  <16 - 50 ug/g     Normal           None  >50 -120 ug/g     Borderline       Re-evaluate in 4-6 weeks      >120 ug/g     Abnormal         Repeat as clinically                                     indicated   • Campylobacter 09/30/2021 Not Detected  Not Detected Final   • Plesiomonas shigelloides 09/30/2021 Not Detected  Not Detected Final   • Salmonella 09/30/2021 Not Detected  Not Detected Final   • Vibrio 09/30/2021 Not Detected  Not Detected Final   • Vibrio cholerae 09/30/2021 Not Detected  Not Detected Final   • Yersinia enterocolitica " 09/30/2021 Not Detected  Not Detected Final   • Enteroaggregative E. coli (EAEC) 09/30/2021 Not Detected  Not Detected Final   • Enteropathogenic E. coli (EPEC) 09/30/2021 Not Detected  Not Detected Final   • Enterotoxigenic E. coli (ETEC) lt/* 09/30/2021 Not Detected  Not Detected Final   • Shiga-like toxin-producing E. coli* 09/30/2021 Not Detected  Not Detected Final   • Shigella/Enteroinvasive E. coli (E* 09/30/2021 Not Detected  Not Detected Final   • Cryptosporidium 09/30/2021 Not Detected  Not Detected Final   • Cyclospora cayetanensis 09/30/2021 Not Detected  Not Detected Final   • Entamoeba histolytica 09/30/2021 Not Detected  Not Detected Final   • Giardia lamblia 09/30/2021 Not Detected  Not Detected Final   • Adenovirus F40/41 09/30/2021 Not Detected  Not Detected Final   • Astrovirus 09/30/2021 Not Detected  Not Detected Final   • Norovirus GI/GII 09/30/2021 Not Detected  Not Detected Final   • Rotavirus A 09/30/2021 Not Detected  Not Detected Final   • Sapovirus (I, II, IV or V) 09/30/2021 Not Detected  Not Detected Final   • Pancreatic Fecal 09/30/2021 >500  >200 ug Elast./g Final           Severe Pancreatic Insufficiency:          <100         Moderate Pancreatic Insufficiency:   100 - 200         Normal:                                   >200   Lab on 09/29/2021   Component Date Value Ref Range Status   • IgA 09/29/2021 143  70 - 400 mg/dL Final   • WBC 09/29/2021 4.78  3.40 - 10.80 10*3/mm3 Final   • RBC 09/29/2021 4.17  4.14 - 5.80 10*6/mm3 Final   • Hemoglobin 09/29/2021 12.9* 13.0 - 17.7 g/dL Final   • Hematocrit 09/29/2021 38.5  37.5 - 51.0 % Final   • MCV 09/29/2021 92.3  79.0 - 97.0 fL Final   • MCH 09/29/2021 30.9  26.6 - 33.0 pg Final   • MCHC 09/29/2021 33.5  31.5 - 35.7 g/dL Final   • RDW 09/29/2021 12.2* 12.3 - 15.4 % Final   • RDW-SD 09/29/2021 41.0  37.0 - 54.0 fl Final   • MPV 09/29/2021 11.9  6.0 - 12.0 fL Final   • Platelets 09/29/2021 251  140 - 450 10*3/mm3 Final   • Neutrophil %  09/29/2021 47.3  42.7 - 76.0 % Final   • Lymphocyte % 09/29/2021 37.4  19.6 - 45.3 % Final   • Monocyte % 09/29/2021 11.9  5.0 - 12.0 % Final   • Eosinophil % 09/29/2021 1.9  0.3 - 6.2 % Final   • Basophil % 09/29/2021 1.3  0.0 - 1.5 % Final   • Immature Grans % 09/29/2021 0.2  0.0 - 0.5 % Final   • Neutrophils, Absolute 09/29/2021 2.26  1.70 - 7.00 10*3/mm3 Final   • Lymphocytes, Absolute 09/29/2021 1.79  0.70 - 3.10 10*3/mm3 Final   • Monocytes, Absolute 09/29/2021 0.57  0.10 - 0.90 10*3/mm3 Final   • Eosinophils, Absolute 09/29/2021 0.09  0.00 - 0.40 10*3/mm3 Final   • Basophils, Absolute 09/29/2021 0.06  0.00 - 0.20 10*3/mm3 Final   • Immature Grans, Absolute 09/29/2021 0.01  0.00 - 0.05 10*3/mm3 Final   • nRBC 09/29/2021 0.0  0.0 - 0.2 /100 WBC Final   • Glucose 09/29/2021 91  65 - 99 mg/dL Final   • BUN 09/29/2021 11  8 - 23 mg/dL Final   • Creatinine 09/29/2021 0.91  0.76 - 1.27 mg/dL Final   • Sodium 09/29/2021 142  136 - 145 mmol/L Final   • Potassium 09/29/2021 3.9  3.5 - 5.2 mmol/L Final   • Chloride 09/29/2021 106  98 - 107 mmol/L Final   • CO2 09/29/2021 28.4  22.0 - 29.0 mmol/L Final   • Calcium 09/29/2021 9.7  8.6 - 10.5 mg/dL Final   • Total Protein 09/29/2021 6.7  6.0 - 8.5 g/dL Final   • Albumin 09/29/2021 4.30  3.50 - 5.20 g/dL Final   • ALT (SGPT) 09/29/2021 19  1 - 41 U/L Final   • AST (SGOT) 09/29/2021 16  1 - 40 U/L Final   • Alkaline Phosphatase 09/29/2021 56  39 - 117 U/L Final   • Total Bilirubin 09/29/2021 1.0  0.0 - 1.2 mg/dL Final   • eGFR Non African Amer 09/29/2021 83  >60 mL/min/1.73 Final   • Globulin 09/29/2021 2.4  gm/dL Final   • A/G Ratio 09/29/2021 1.8  g/dL Final   • BUN/Creatinine Ratio 09/29/2021 12.1  7.0 - 25.0 Final   • Anion Gap 09/29/2021 7.6  5.0 - 15.0 mmol/L Final   • TSH 09/29/2021 0.911  0.270 - 4.200 uIU/mL Final   • Tissue Transglutaminase IgA 09/29/2021 <2  0 - 3 U/mL Final                                  Negative        0 -  3                                 "Weak Positive   4 - 10                                Positive           >10   Tissue Transglutaminase (tTG) has been identified   as the endomysial antigen.  Studies have demonstr-   ated that endomysial IgA antibodies have over 99%   specificity for gluten sensitive enteropathy.      CT Abdomen Pelvis With Contrast  Result Date: 10/14/2021  No evidence of acute intra-abdominal process.     Assessment / Plan      1. Change in bowel habits  2. Diarrhea, unspecified type  3. Syncope, unspecified syncope type  11/9/2021  He completed several lab, urine and imaging tests with no GI cause of his complaints found. He had Urinary HIAA which was justin, ruled out carcinoid. Had normal serum trypsin level, ruled out systemic mastocytosis. Celiac disease negative. Hgb minimally decreased 12.9. Liver enzymes normal. Kidney function normal.   TSH normal. Stool testing all negative for bacteria, inflammation or pancreatic insufficiency. CT scan did not show any contributing GI pathology. He does not want treatment or further evaluation of mild diarrhea at this time, all related to his syncopal episodes. Will get neurology referral.   - Ambulatory Referral to Neurology    9/29/2021  He has severe \"abdominal rolling\" with borborygmi prior to episodes of syncope and excessive flatulence during syncope, diarrhea after regaining consciousness. Already had unremarkable cardiology evaluation, planning to see neurology after GI. No history of anemia. Uncertain if his syncope is related to his gastrointestinal complaints but he does indeed always have associated GI complaints during an episode. Will have labs today and stool testing as well as CTAP for evaluation. May consider endoscopic procedures to be arranged at next visit.     4. History of colon polyps  Last colonoscopy was in 2017 at [place] and had 1 colon polyp removed. There is no known family history of colon cancer or colon polyps. No family history of GI disease. Will " request op/path for review. Will likely need repeat colonoscopy 5 years after his last, due 2022.           Follow Up:   Return if symptoms worsen or fail to improve.      Liz Jorgensen PA-C  [facility]  11/10/2021  14:23 EST    Please note that portions of this note may have been completed with a voice recognition program. Efforts were made to edit the dictations, but occasionally words are mistranscribed.

## 2022-02-03 ENCOUNTER — OFFICE VISIT (OUTPATIENT)
Dept: NEUROLOGY | Facility: CLINIC | Age: 67
End: 2022-02-03

## 2022-02-03 ENCOUNTER — LAB (OUTPATIENT)
Dept: LAB | Facility: HOSPITAL | Age: 67
End: 2022-02-03

## 2022-02-03 VITALS
TEMPERATURE: 97.7 F | OXYGEN SATURATION: 99 % | BODY MASS INDEX: 29.12 KG/M2 | HEART RATE: 67 BPM | HEIGHT: 72 IN | WEIGHT: 215 LBS | DIASTOLIC BLOOD PRESSURE: 72 MMHG | SYSTOLIC BLOOD PRESSURE: 140 MMHG

## 2022-02-03 DIAGNOSIS — R79.9 ABNORMAL FINDING OF BLOOD CHEMISTRY, UNSPECIFIED: ICD-10-CM

## 2022-02-03 DIAGNOSIS — R55 VASOVAGAL SYNCOPE: Primary | ICD-10-CM

## 2022-02-03 DIAGNOSIS — R55 VASOVAGAL SYNCOPE: ICD-10-CM

## 2022-02-03 DIAGNOSIS — R40.4 TRANSIENT ALTERATION OF AWARENESS: ICD-10-CM

## 2022-02-03 DIAGNOSIS — R93.3 ABNORMAL FINDINGS ON DIAGNOSTIC IMAGING OF OTHER PARTS OF DIGESTIVE TRACT: ICD-10-CM

## 2022-02-03 LAB
CHOLEST SERPL-MCNC: 189 MG/DL (ref 0–200)
HBA1C MFR BLD: 5.7 % (ref 4.8–5.6)
HDLC SERPL QL: 4.97
HDLC SERPL-MCNC: 38 MG/DL (ref 40–60)
LDLC SERPL CALC-MCNC: 125 MG/DL (ref 0–100)
TRIGL SERPL-MCNC: 144 MG/DL (ref 0–150)
VLDLC SERPL-MCNC: 26 MG/DL (ref 5–40)

## 2022-02-03 PROCEDURE — 82746 ASSAY OF FOLIC ACID SERUM: CPT

## 2022-02-03 PROCEDURE — 83036 HEMOGLOBIN GLYCOSYLATED A1C: CPT

## 2022-02-03 PROCEDURE — 80061 LIPID PANEL: CPT

## 2022-02-03 PROCEDURE — 36415 COLL VENOUS BLD VENIPUNCTURE: CPT

## 2022-02-03 PROCEDURE — 82607 VITAMIN B-12: CPT

## 2022-02-03 PROCEDURE — 83921 ORGANIC ACID SINGLE QUANT: CPT

## 2022-02-03 PROCEDURE — 99204 OFFICE O/P NEW MOD 45 MIN: CPT | Performed by: NURSE PRACTITIONER

## 2022-02-03 RX ORDER — OMEPRAZOLE 20 MG/1
CAPSULE, DELAYED RELEASE ORAL
COMMUNITY
Start: 2021-12-07
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
## 2022-02-03 NOTE — PATIENT INSTRUCTIONS
Syncope    Syncope refers to a condition in which a person temporarily loses consciousness. Syncope may also be called fainting or passing out. It is caused by a sudden decrease in blood flow to the brain. Even though most causes of syncope are not dangerous, syncope can be a sign of a serious medical problem. Your health care provider may do tests to find the reason why you are having syncope.  Signs that you may be about to faint include:  · Feeling dizzy or light-headed.  · Feeling nauseous.  · Seeing all white or all black in your field of vision.  · Having cold, clammy skin.  If you faint, get medical help right away. Call your local emergency services (911 in the [place] Do not drive yourself to the hospital.  Follow these instructions at home:  Pay attention to any changes in your symptoms. Take these actions to stay safe and to help relieve your symptoms:  Lifestyle  · Do not drive, use machinery, or play sports until your health care provider says it is okay.  · Do not drink alcohol.  · Do not use any products that contain nicotine or tobacco, such as cigarettes and e-cigarettes. If you need help quitting, ask your health care provider.  · Drink enough fluid to keep your urine pale yellow.  General instructions  · Take over-the-counter and prescription medicines only as told by your health care provider.  · If you are taking blood pressure or heart medicine, get up slowly and take several minutes to sit and then stand. This can reduce dizziness or light-headedness.  · Have someone stay with you until you feel stable.  · If you start to feel like you might faint, lie down right away and raise (elevate) your feet above the level of your heart. Breathe deeply and steadily. Wait until all the symptoms have passed.  · Keep all follow-up visits as told by your health care provider. This is important.  Get help right away if you:  · Have a severe headache.  · Faint once or repeatedly.  · Have pain in your chest,  abdomen, or back.  · Have a very fast or irregular heartbeat (palpitations).  · Have pain when you breathe.  · Are bleeding from your mouth or rectum, or you have black or tarry stool.  · Have a seizure.  · Are confused.  · Have trouble walking.  · Have severe weakness.  · Have vision problems.  These symptoms may represent a serious problem that is an emergency. Do not wait to see if your symptoms will go away. Get medical help right away. Call your local emergency services (911 in the [place] Do not drive yourself to the hospital.  Summary  · Syncope refers to a condition in which a person temporarily loses consciousness. It is caused by a sudden decrease in blood flow to the brain.  · Signs that you may be about to faint include dizziness, feeling light-headed, feeling nauseous, sudden vision changes, or cold, clammy skin.  · Although most causes of syncope are not dangerous, syncope can be a sign of a serious medical problem. If you faint, get medical help right away.  This information is not intended to replace advice given to you by your health care provider. Make sure you discuss any questions you have with your health care provider.  Document Revised: 04/29/2021 Document Reviewed: 04/29/2021  Elsevier Patient Education © 2021 [employer]

## 2022-02-04 DIAGNOSIS — E78.5 DYSLIPIDEMIA, GOAL LDL BELOW 100: ICD-10-CM

## 2022-02-04 DIAGNOSIS — R79.9 ABNORMAL FINDING OF BLOOD CHEMISTRY, UNSPECIFIED: Primary | ICD-10-CM

## 2022-02-04 LAB
FOLATE SERPL-MCNC: 10.6 NG/ML (ref 4.78–24.2)
VIT B12 BLD-MCNC: 1187 PG/ML (ref 211–946)

## 2022-02-04 RX ORDER — ATORVASTATIN CALCIUM 80 MG/1
80 TABLET, FILM COATED ORAL DAILY
Qty: 90 TABLET | Refills: 1 | Status: SHIPPED | OUTPATIENT
Start: 2022-02-04 | End: 2022-03-17

## 2022-02-09 LAB — METHYLMALONATE SERPL-SCNC: 134 NMOL/L (ref 0–378)

## 2022-02-17 ENCOUNTER — HOSPITAL ENCOUNTER (OUTPATIENT)
Dept: CT IMAGING | Facility: HOSPITAL | Age: 67
Discharge: HOME | End: 2022-02-17

## 2022-02-17 ENCOUNTER — HOSPITAL ENCOUNTER (OUTPATIENT)
Dept: SLEEP MEDICINE | Facility: HOSPITAL | Age: 67
End: 2022-02-17

## 2022-02-17 DIAGNOSIS — R40.4 TRANSIENT ALTERATION OF AWARENESS: ICD-10-CM

## 2022-02-17 DIAGNOSIS — R55 VASOVAGAL SYNCOPE: ICD-10-CM

## 2022-02-17 LAB — CREAT BLDA-MCNC: 1 MG/DL (ref 0.6–1.3)

## 2022-02-17 PROCEDURE — 70496 CT ANGIOGRAPHY HEAD: CPT

## 2022-02-17 PROCEDURE — 25010000002 IOPAMIDOL 61 % SOLUTION: Performed by: NURSE PRACTITIONER

## 2022-02-17 PROCEDURE — 82565 ASSAY OF CREATININE: CPT

## 2022-02-17 PROCEDURE — 95816 EEG AWAKE AND DROWSY: CPT

## 2022-02-17 PROCEDURE — 70498 CT ANGIOGRAPHY NECK: CPT

## 2022-02-17 RX ADMIN — IOPAMIDOL 100 ML: 612 INJECTION, SOLUTION INTRAVENOUS at 08:24

## 2022-02-18 ENCOUNTER — TELEPHONE (OUTPATIENT)
Dept: NEUROLOGY | Facility: CLINIC | Age: 67
End: 2022-02-18

## 2022-02-18 NOTE — TELEPHONE ENCOUNTER
Caller: TUSHAR    Relationship: SELF    Best call back number: [phone]    What is the best time to reach you: ANYTIME    Who are you requesting to speak with (clinical staff, provider,  specific staff member): CLINICAL    Do you know the name of the person who called: GENA    What was the call regarding: IMAGING RESULTS    Do you require a callback: YES      Hub staff attempted to follow warm transfer process and was unsuccessful

## 2022-03-16 ENCOUNTER — OFFICE VISIT (OUTPATIENT)
Dept: NEUROLOGY | Facility: CLINIC | Age: 67
End: 2022-03-16

## 2022-03-16 VITALS
TEMPERATURE: 96.8 F | HEIGHT: 72 IN | DIASTOLIC BLOOD PRESSURE: 64 MMHG | WEIGHT: 214 LBS | BODY MASS INDEX: 28.99 KG/M2 | SYSTOLIC BLOOD PRESSURE: 138 MMHG | OXYGEN SATURATION: 97 % | HEART RATE: 90 BPM

## 2022-03-16 DIAGNOSIS — R55 VASOVAGAL SYNCOPE: Primary | ICD-10-CM

## 2022-03-16 PROCEDURE — 99213 OFFICE O/P EST LOW 20 MIN: CPT | Performed by: NURSE PRACTITIONER

## 2022-03-16 RX ORDER — LOVASTATIN 40 MG/1
TABLET ORAL
COMMUNITY
Start: 2022-03-05

## 2022-03-16 RX ORDER — AMLODIPINE BESYLATE 10 MG/1
TABLET ORAL
COMMUNITY
Start: 2022-03-07

## 2022-03-16 NOTE — PROGRESS NOTES
Follow Up Neurology Office Visit      Patient Name: Sabina Wilkinson    Referring Physician: No ref. provider found    Chief Complaint:    Chief Complaint   Patient presents with   • Follow-up     Pt in office to follow up on vasovagal syncope       History of Present Illness: Sabina Wilkinson is a 66 y.o. male who is here to follow up with Neurology for syncopal episodes.   1 episode on March 8, noted BP was elevated and remained elevated, so he did see PCP who adjusted BP meds.     He does note that he can feel episodes coming on, he estimates he usually has 1-2 minutes to lay down and get his feet up. This can help prevent syncope, but does not always help.     The following portions of the patient's history were reviewed and updated as appropriate: allergies, current medications, past family history, past medical history, past social history, past surgical history and problem list.    Subjective     Review of Systems:   Review of Systems   Gastrointestinal: Positive for diarrhea.   Neurological: Positive for dizziness and syncope.   All other systems reviewed and are negative.    Medications:     Current Outpatient Medications:   •  amLODIPine (NORVASC) 10 MG tablet, , Disp: , Rfl:   •  ascorbic acid (VITAMIN C) 500 MG tablet, Take 500 mg by mouth Daily., Disp: , Rfl:   •  Cyanocobalamin (VITAMIN B 12 PO), Take 250 mcg by mouth Daily., Disp: , Rfl:   •  Diclofenac Sodium (VOLTAREN) 1 % gel gel, , Disp: , Rfl:   •  glucosamine-chondroitin 500-400 MG capsule capsule, Take 2 capsules by mouth Daily., Disp: , Rfl:   •  hydroCHLOROthiazide (HYDRODIURIL) 25 MG tablet, Take 25 mg by mouth Daily., Disp: , Rfl:   •  lovastatin (MEVACOR) 40 MG tablet, , Disp: , Rfl:   •  omeprazole (priLOSEC) 20 MG capsule, , Disp: , Rfl:   •  VITAMIN D PO, Take 500 mg by mouth Daily., Disp: , Rfl:     Allergies:   Allergies   Allergen Reactions   • Penicillins Other (See Comments) and Rash     Objective     Physical Exam:  Vital Signs:  "  Vitals:    03/16/22 1004   BP: 138/64   Pulse: 90   Temp: 96.8 °F (36 °C)   SpO2: 97%   Weight: 97.1 kg (214 lb)   Height: 182.9 cm (72\")   PainSc: 0-No pain     Physical Exam  Vitals and nursing note reviewed.   Constitutional:       Appearance: Normal appearance.   Pulmonary:      Effort: Pulmonary effort is normal.   Neurological:      Mental Status: He is oriented to person, place, and time. Mental status is at baseline.      Gait: Gait is intact.      Deep Tendon Reflexes: Strength normal.   Psychiatric:         Mood and Affect: Mood normal.         Speech: Speech normal.         Behavior: Behavior normal.         Thought Content: Thought content normal.         Judgment: Judgment normal.       Neurologic Exam     Mental Status   Oriented to person, place, and time.   Attention: normal. Concentration: normal.   Speech: speech is normal   Level of consciousness: alert  Normal comprehension.     Cranial Nerves   Cranial nerves II through XII intact.     Motor Exam   Muscle bulk: normal  Overall muscle tone: normal    Strength   Strength 5/5 throughout.     Gait, Coordination, and Reflexes     Gait  Gait: normal    Tremor   Resting tremor: absent    Results Review:   I reviewed the patient's new clinical results.  I have reviewed the patient's other medical records to include, labs, radiology and referrals.     Assessment / Plan      Assessment/Plan:   Diagnoses and all orders for this visit:    1. Vasovagal syncope (Primary)  -     Ambulatory Referral to Cardiology    Patient has not yet scheduled for tilt table evaluation, have asked him to do so to confirm diagnosis of vasovagal syncope as the symptoms are certainly consistent with this.  He does mention today that he can often feel episode coming on, which does raise question of seizure aura.  If tilt table unremarkable, would consider EEG and possible EMU evaluation.    Follow Up:   Return in about 3 months (around 6/16/2022).     Erica Haskins, " NICOLE  [facility]   AS THE PROVIDER, I PERSONALLY WORE PPE DURING ENTIRE FACE TO FACE ENCOUNTER IN CLINIC WITH THE PATIENT. PATIENT ALSO WORE PPE DURING ENTIRE FACE TO FACE ENCOUNTER EXCEPT FOR A MAX OF 30 SECONDS DURING NEUROLOGICAL EVALUATION OF CRANIAL NERVES AND THEN MASK WAS PLACED BACK OVER PATIENT FACE FOR REMAINDER OF VISIT. I WASHED MY HANDS BEFORE AND AFTER VISIT.    Please note that portions of this note may have been completed with a voice recognition program. Efforts were made to edit the dictations, but occasionally words are mistranscribed.

## 2023-07-27 ENCOUNTER — TRANSCRIBE ORDERS (OUTPATIENT)
Dept: ADMINISTRATIVE | Facility: HOSPITAL | Age: 68
End: 2023-07-27
Payer: MEDICARE

## 2023-07-27 DIAGNOSIS — R55 SYNCOPE AND COLLAPSE: Primary | ICD-10-CM

## 2023-08-15 ENCOUNTER — HOSPITAL ENCOUNTER (OUTPATIENT)
Dept: SLEEP MEDICINE | Facility: HOSPITAL | Age: 68
Discharge: HOME | End: 2023-08-15
Admitting: FAMILY MEDICINE
Payer: MEDICARE

## 2023-08-15 DIAGNOSIS — R55 SYNCOPE AND COLLAPSE: ICD-10-CM

## 2023-08-15 PROCEDURE — 95816 EEG AWAKE AND DROWSY: CPT

## (undated) DEVICE — BLD CLIP UNIV SURG GRY

## (undated) DEVICE — RICH MAJOR PROCEDURE: Brand: MEDLINE INDUSTRIES, INC.

## (undated) DEVICE — PENCL ES MEGADINE EZ/CLEAN BUTN W/HOLSTR 10FT

## (undated) DEVICE — SUT VIC 2/0 SH 27IN

## (undated) DEVICE — SUT VIC 3/0 SH 27IN J416H

## (undated) DEVICE — DRSNG WND GZ PAD BORDERED LF 4X5IN STRL

## (undated) DEVICE — NDL HYPO ECLPS SFTY 22G 1 1/2IN

## (undated) DEVICE — SYR LUERLOK 30CC

## (undated) DEVICE — SUT SILK 2/0 SH 30IN K833H

## (undated) DEVICE — FLEXIBLE YANKAUER,MEDIUM TIP, NO VACUUM CONTROL: Brand: ARGYLE

## (undated) DEVICE — UNDYED BRAIDED (POLYGLACTIN 910), SYNTHETIC ABSORBABLE SUTURE: Brand: COATED VICRYL

## (undated) DEVICE — DRN PENRS SIL 1/4X18IN LF STRL

## (undated) DEVICE — GLV SURG TRIUMPH MICRO PF LTX 7.5 STRL

## (undated) DEVICE — SLV SCD CALF HEMOFORCE DVT THERP REPROC MD

## (undated) DEVICE — STRIP,CLOSURE,WOUND,MEDI-STRIP,1/2X4: Brand: MEDLINE

## (undated) DEVICE — SUT VIC 0/0 UR6 27IN DYED J603H

## (undated) DEVICE — DRSNG WND BORDR/ADHS NONADHR/GZ LF 4X4IN STRL